# Patient Record
Sex: MALE | Race: ASIAN | Employment: FULL TIME | URBAN - METROPOLITAN AREA
[De-identification: names, ages, dates, MRNs, and addresses within clinical notes are randomized per-mention and may not be internally consistent; named-entity substitution may affect disease eponyms.]

---

## 2024-08-19 ENCOUNTER — HOSPITAL ENCOUNTER (OUTPATIENT)
Age: 39
Discharge: HOME | End: 2024-08-19
Payer: COMMERCIAL

## 2024-08-19 VITALS
WEIGHT: 170 LBS | DIASTOLIC BLOOD PRESSURE: 100 MMHG | HEART RATE: 125 BPM | HEIGHT: 66 IN | TEMPERATURE: 100 F | SYSTOLIC BLOOD PRESSURE: 143 MMHG | RESPIRATION RATE: 20 BRPM | BODY MASS INDEX: 27.32 KG/M2 | OXYGEN SATURATION: 95 %

## 2024-08-19 DIAGNOSIS — N39.0 UTI (URINARY TRACT INFECTION): Primary | ICD-10-CM

## 2024-08-19 LAB
BILIRUB UR QL STRIP: NEGATIVE
CLARITY UR: CLEAR
COLOR UR: YELLOW
GLUCOSE UR STRIP-MCNC: NEGATIVE MG/DL
LEUKOCYTE ESTERASE UR QL STRIP: NEGATIVE
NITRITE UR QL STRIP: NEGATIVE
PH UR STRIP: 6.5 [PH]
PROT UR STRIP-MCNC: NEGATIVE MG/DL
SP GR UR STRIP: 1.01
UROBILINOGEN UR STRIP-ACNC: <2 MG/DL

## 2024-08-19 PROCEDURE — 81002 URINALYSIS NONAUTO W/O SCOPE: CPT | Performed by: PHYSICIAN ASSISTANT

## 2024-08-19 PROCEDURE — 99203 OFFICE O/P NEW LOW 30 MIN: CPT | Performed by: PHYSICIAN ASSISTANT

## 2024-08-19 PROCEDURE — 87086 URINE CULTURE/COLONY COUNT: CPT | Performed by: PHYSICIAN ASSISTANT

## 2024-08-19 NOTE — ED PROVIDER NOTES
Patient Seen in: [facility]      History     Chief Complaint   Patient presents with    Urinary Symptoms     Fever of 102.9 and back pain - Entered by patient     Stated Complaint: Urinary Symptoms - Fever of 102.9 and back pain    Subjective:   HPI  39-year-old male presents with acute onset of urinary frequency/urgency/dysuria with associated low back pain and reported fevers.  Denies flank pain, nausea, vomiting, diarrhea.    Objective:   History reviewed. No pertinent past medical history.           History reviewed. No pertinent surgical history.             Social History     Socioeconomic History    Marital status: Single   Tobacco Use    Smoking status: Never    Smokeless tobacco: Never   Vaping Use    Vaping status: Never Used   Substance and Sexual Activity    Alcohol use: Not Currently    Drug use: Never              Review of Systems   All other systems reviewed and are negative.      Positive for stated Chief Complaint: Urinary Symptoms (Fever of 102.9 and back pain - Entered by patient)    Other systems are as noted in HPI.  Constitutional and vital signs reviewed.      All other systems reviewed and negative except as noted above.    Physical Exam     ED Triage Vitals [08/19/24 1749]   BP (!) 143/100   Pulse (!) 125   Resp 20   Temp 100 °F (37.8 °C)   Temp src Oral   SpO2 95 %   O2 Device None (Room air)       Current Vitals:   Vital Signs  BP: (!) 143/100  Pulse: (!) 125  Resp: 20  Temp: 100 °F (37.8 °C)  Temp src: Oral    Oxygen Therapy  SpO2: 95 %  O2 Device: None (Room air)            Physical Exam  Vitals and nursing note reviewed.   Constitutional:       General: He is not in acute distress.     Appearance: Normal appearance. He is normal weight. He is not ill-appearing, toxic-appearing or diaphoretic.   HENT:      Head: Normocephalic and atraumatic.      Right Ear: Tympanic membrane and ear canal normal.      Left Ear: Tympanic membrane and ear canal normal.      Nose:  Congestion present. No rhinorrhea.      Mouth/Throat:      Mouth: Mucous membranes are moist.      Pharynx: Oropharynx is clear. No oropharyngeal exudate or posterior oropharyngeal erythema.   Eyes:      Extraocular Movements: Extraocular movements intact.      Conjunctiva/sclera: Conjunctivae normal.      Pupils: Pupils are equal, round, and reactive to light.   Cardiovascular:      Rate and Rhythm: Normal rate.      Pulses: Normal pulses.   Pulmonary:      Effort: Pulmonary effort is normal. No respiratory distress.      Breath sounds: Normal breath sounds. No stridor. No wheezing, rhonchi or rales.   Chest:      Chest wall: No tenderness.   Abdominal:      Tenderness: There is no right CVA tenderness or left CVA tenderness.   Musculoskeletal:         General: No swelling, tenderness, deformity or signs of injury. Normal range of motion.      Cervical back: Normal range of motion and neck supple.      Right lower leg: No edema.      Left lower leg: No edema.   Skin:     General: Skin is warm and dry.      Capillary Refill: Capillary refill takes less than 2 seconds.      Coloration: Skin is not jaundiced or pale.      Findings: No bruising, erythema, lesion or rash.   Neurological:      General: No focal deficit present.      Mental Status: He is alert and oriented to person, place, and time. Mental status is at baseline.   Psychiatric:         Mood and Affect: Mood normal.         Behavior: Behavior normal.         Thought Content: Thought content normal.         Judgment: Judgment normal.               ED Course     Labs Reviewed   [facility] POCT URINALYSIS DIPSTICK - Abnormal; Notable for the following components:       Result Value    Ketone, Urine Trace (*)     Blood, Urine Moderate (*)     All other components within normal limits   URINE CULTURE, ROUTINE                      MDM                                        Medical Decision Making  39-year-old male presents with acute onset of urinary  frequency/urgency/dysuria started over 72 hours prior to arrival.  Considerations to include acute cystitis vs  pyelonephritis versus nephrolithiasis.  Currently patient denies  flank/ abdominal pain, weakness, bowel/bladder incontinence, hematuria, dysuria, fevers, chills.    Plan   - UA/ POC urine dipstick.   Urine culture   - DC to home   - OTC  Pyridium 100mg po TID x 3 days.   -refer to PCP   - return to ED/ clinic if symptoms worsens      Amount and/or Complexity of Data Reviewed  Labs: ordered. Decision-making details documented in ED Course.     Details: Point-of-care urine dipstick moderate blood with trace ketones        Disposition and Plan     Clinical Impression:  1. UTI (urinary tract infection)         Disposition:  Discharge  8/19/2024  6:59 pm    Follow-up:  No follow-up provider specified.        Medications Prescribed:  There are no discharge medications for this patient.

## 2024-08-19 NOTE — ED INITIAL ASSESSMENT (HPI)
Fever, frequency, burning & low back pain x 72 hours.  Denies N/V/D of abd pain.  Denies hx of kidney stones or UTI.  Tylenol 1 gram @ 0800.

## 2024-09-07 ENCOUNTER — APPOINTMENT (OUTPATIENT)
Dept: ULTRASOUND IMAGING | Facility: HOSPITAL | Age: 39
End: 2024-09-07
Attending: EMERGENCY MEDICINE
Payer: MEDICAID

## 2024-09-07 ENCOUNTER — HOSPITAL ENCOUNTER (EMERGENCY)
Facility: HOSPITAL | Age: 39
Discharge: HOME | End: 2024-09-07
Attending: EMERGENCY MEDICINE
Payer: MEDICAID

## 2024-09-07 VITALS
RESPIRATION RATE: 18 BRPM | TEMPERATURE: 98 F | WEIGHT: 170 LBS | BODY MASS INDEX: 27.32 KG/M2 | DIASTOLIC BLOOD PRESSURE: 94 MMHG | SYSTOLIC BLOOD PRESSURE: 137 MMHG | HEIGHT: 66 IN | HEART RATE: 109 BPM

## 2024-09-07 DIAGNOSIS — N45.2 ORCHITIS: Primary | ICD-10-CM

## 2024-09-07 LAB
BILIRUB UR QL STRIP.AUTO: NEGATIVE
CLARITY UR REFRACT.AUTO: CLEAR
COLOR UR AUTO: YELLOW
GLUCOSE UR STRIP.AUTO-MCNC: NORMAL MG/DL
KETONES UR STRIP.AUTO-MCNC: NEGATIVE MG/DL
LEUKOCYTE ESTERASE UR QL STRIP.AUTO: 250
NITRITE UR QL STRIP.AUTO: NEGATIVE
PH UR STRIP.AUTO: 6 [PH] (ref 5–8)
PROT UR STRIP.AUTO-MCNC: 30 MG/DL
SP GR UR STRIP.AUTO: 1.01 (ref 1–1.03)
UROBILINOGEN UR STRIP.AUTO-MCNC: NORMAL MG/DL

## 2024-09-07 PROCEDURE — 87086 URINE CULTURE/COLONY COUNT: CPT | Performed by: EMERGENCY MEDICINE

## 2024-09-07 PROCEDURE — 76870 US EXAM SCROTUM: CPT | Performed by: EMERGENCY MEDICINE

## 2024-09-07 PROCEDURE — 81001 URINALYSIS AUTO W/SCOPE: CPT | Performed by: EMERGENCY MEDICINE

## 2024-09-07 PROCEDURE — 99284 EMERGENCY DEPT VISIT MOD MDM: CPT

## 2024-09-07 PROCEDURE — 99285 EMERGENCY DEPT VISIT HI MDM: CPT

## 2024-09-07 PROCEDURE — 93975 VASCULAR STUDY: CPT | Performed by: EMERGENCY MEDICINE

## 2024-09-07 PROCEDURE — 96372 THER/PROPH/DIAG INJ SC/IM: CPT

## 2024-09-07 RX ORDER — NAPROXEN 500 MG/1
500 TABLET ORAL 2 TIMES DAILY PRN
Qty: 20 TABLET | Refills: 0 | Status: SHIPPED | OUTPATIENT
Start: 2024-09-07 | End: 2024-09-14

## 2024-09-07 RX ORDER — KETOROLAC TROMETHAMINE 30 MG/ML
30 INJECTION, SOLUTION INTRAMUSCULAR; INTRAVENOUS ONCE
Status: COMPLETED | OUTPATIENT
Start: 2024-09-07 | End: 2024-09-07

## 2024-09-07 RX ORDER — ACETAMINOPHEN 500 MG
1000 TABLET ORAL ONCE
Status: COMPLETED | OUTPATIENT
Start: 2024-09-07 | End: 2024-09-07

## 2024-09-07 RX ORDER — SULFAMETHOXAZOLE/TRIMETHOPRIM 800-160 MG
1 TABLET ORAL ONCE
Status: COMPLETED | OUTPATIENT
Start: 2024-09-07 | End: 2024-09-07

## 2024-09-07 RX ORDER — SULFAMETHOXAZOLE/TRIMETHOPRIM 800-160 MG
1 TABLET ORAL 2 TIMES DAILY
Qty: 20 TABLET | Refills: 0 | Status: SHIPPED | OUTPATIENT
Start: 2024-09-07 | End: 2024-09-17

## 2024-09-07 NOTE — ED INITIAL ASSESSMENT (HPI)
Patient reports low back pain with pain that radiates to left groin and left swollen testicle. Was seen 8/19 at [facility] for similar pain diagnosed with UTI but culture was negative never on ABX only given pyridium with some relief. Pain today much worse and swollen testicle symptom is new.

## 2024-09-07 NOTE — ED PROVIDER NOTES
Patient Seen in: [facility] Emergency Department      History     Chief Complaint   Patient presents with    Eval-G     Stated Complaint: eval g    Subjective:   HPI    Left testicle red and swollen since yesterday.  Took some Azo did not help.  Today had a fever so wanted come in.  Denies any dysuria.  No falls no trauma.        Objective:   History reviewed. No pertinent past medical history.           History reviewed. No pertinent surgical history.             Social History     Socioeconomic History    Marital status: Single   Tobacco Use    Smoking status: Never    Smokeless tobacco: Never   Vaping Use    Vaping status: Never Used   Substance and Sexual Activity    Alcohol use: Not Currently    Drug use: Never              Review of Systems    Positive for stated Chief Complaint: Eval-G    Other systems are as noted in HPI.  Constitutional and vital signs reviewed.      All other systems reviewed and negative except as noted above.    Physical Exam     ED Triage Vitals [09/07/24 1649]   BP (!) 137/94   Pulse 101   Resp 18   Temp 98.3 °F (36.8 °C)   Temp src Temporal   SpO2    O2 Device        Current Vitals:   Vital Signs  BP: (!) 137/94  Pulse: 109  Resp: 18  Temp: 98.3 °F (36.8 °C)  Temp src: Temporal  MAP (mmHg): (!) 106            Physical Exam    Physical Exam   Constitutional: Awake, alert, well appearing  Head: Normocephalic and atraumatic.   Eyes: Conjunctivae are normal. Pupils are equal, round, and reactive to light.   Neck: Normal range of motion. No JVD  Cardiovascular: Normal rate, regular rhythm  Pulmonary/Chest: Normal effort.  No accessory muscle use.  No cyanosis.  Abdominal: Soft. Not distended.  Neurological: Pt is alert and oriented to person, place, and time. no cranial nerve deficits. Speech fluent        Left side of the scrotum swollen, red, tender.  Right testicle nontender nonswollen.  No discharge noted penis.    ED Course     Labs Reviewed   URINALYSIS WITH CULTURE REFLEX -  Abnormal; Notable for the following components:       Result Value    Blood Urine 2+ (*)     Protein Urine 30 (*)     Leukocyte Esterase Urine 250 (*)     WBC Urine 21-50 (*)     RBC Urine 6-10 (*)     All other components within normal limits   URINE CULTURE, ROUTINE               UA does suggest infection    US SCROTUM W/ DOPPLER (CPT=93975/58441)    Result Date: 9/7/2024  CONCLUSION:  Hyperemic left testicle and epididymis.  This may be seen with epididymo-orchitis.  No sonographic evidence for testicular torsion.  There is echogenic material within the left scrotal sac adjacent to the testicle.  This is concerning for possible inguinal hernia.   LOCATION:  [facility]    Dictated by (CST): Tona Almeida MD on 9/07/2024 at 6:52 PM     Finalized by (CST): Tona Almeida MD on 9/07/2024 at 6:54 PM               MDM              Differential diagnoses considered: Surgical emergencies such as testicular torsion considered however favoring epididymitis orchitis.  Will reevaluate after ultrasound and UA.      -Suspect epididymitis orchitis.  Bactrim x 10 days.  Outpatient  follow-up.    I visualized the radiology studies, my independent interpretation: Torsion not suggested ultrasound    Prescription for Bactrim sent to the pharmacy*      Shared decision making was done by: patient, myself.                                     Medical Decision Making      Disposition and Plan     Clinical Impression:  1. Orchitis         Disposition:  Discharge  9/7/2024  6:33 pm    Follow-up:  Magdiel Welch MD  [address] [place] [address]  [address] [place] [id number]  [phone]    Follow up            Medications Prescribed:  Discharge Medication List as of 9/7/2024  6:37 PM        START taking these medications    Details   sulfamethoxazole-trimethoprim -160 MG Oral Tab per tablet Take 1 tablet by mouth 2 (two) times daily for 10 days., Normal, Disp-20 tablet, R-0      naproxen 500 MG Oral Tab Take 1 tablet (500 mg total) by mouth 2 (two)  times daily as needed., Normal, Disp-20 tablet, R-0